# Patient Record
(demographics unavailable — no encounter records)

---

## 2024-11-22 NOTE — ASSESSMENT
[FreeTextEntry1] : 60 year old M with HTN, HLD who presents for cardiac evaluation.  1) HTN, HLD 2) At risk for CAD - EKG showed sinus rhythm without significant abnormalities - He is asymptomatic and euvolemic on exam - Will check annual labs including lipids, a1c - Continue atenolol 25mg daily - Continue enalapril 10mg daily  3) Follow-up, annually or sooner if symptomatic

## 2024-11-22 NOTE — HISTORY OF PRESENT ILLNESS
[FreeTextEntry1] : 60 year old M with HTN, HLD who presents for cardiac evaluation.  Meds: Atenolol 25, enalapril 10 (pt states these have been his medications for a long time)  Pt has been stable since last visit. He denies CP, SOB, palpitation, dizziness, or LOC. No orthopnea, PND, or LE edema. He is starting to exercise on treadmill walking 30 minutes to 1 hour every day without limitation. He used to see Dr. Saleem annually for routine check-up.  Last seen by Dr. Saleem 11/9/23 - 59-year-old male is basically doing very well but with sedentary life and on treatment for hypertension. He swims with no chest pain, no shortness of breath, no dizziness, no palpitation with or without exertion was experienced. he is here for the yearly appointment. Every day, he is able to swim 1000 meters without manifestations. During pandemic, he has no manifestation or event. But, he contracted COVID in Dec. 2021. He is fine now.